# Patient Record
Sex: FEMALE | Race: WHITE | NOT HISPANIC OR LATINO | Employment: OTHER | URBAN - METROPOLITAN AREA
[De-identification: names, ages, dates, MRNs, and addresses within clinical notes are randomized per-mention and may not be internally consistent; named-entity substitution may affect disease eponyms.]

---

## 2024-07-11 ENCOUNTER — TELEPHONE (OUTPATIENT)
Dept: DERMATOLOGY | Facility: CLINIC | Age: 49
End: 2024-07-11

## 2024-07-11 NOTE — TELEPHONE ENCOUNTER
Called patient regarding her appt with Dr. Arriaga on 10/21. Provider's on call week changed and appt needs to be rescheduled. Spoke with patient and offered an appt on 9/12 in [facility] office. Pt declined, stating office is too far. Patient declined another appt in [place], stating she already waited a long time for this appointment and does not wish to reschedule.